# Patient Record
Sex: MALE | Race: WHITE | Employment: FULL TIME | URBAN - METROPOLITAN AREA
[De-identification: names, ages, dates, MRNs, and addresses within clinical notes are randomized per-mention and may not be internally consistent; named-entity substitution may affect disease eponyms.]

---

## 2020-01-30 ENCOUNTER — TELEPHONE (OUTPATIENT)
Dept: ORTHOPEDIC SURGERY | Age: 55
End: 2020-01-30

## 2020-01-30 NOTE — TELEPHONE ENCOUNTER
I reviewed his chart. I'm sure the delay is with [employer] processing the C9 for additional diagnosis of medial meniscus tear, which may have been denied by his employer. There also is an inconsistency  between the MRI and CT scan on which condyle the insufficiency fracture is. I'm not sure if surgery is necessary or not at this time. I CANNOT guarantee I can negotiate the [employer] system any faster than they are due to my current backlog of [employer] paperwork I have. Considering these factors, if he still wants to see me, then that is fine.

## 2020-02-03 NOTE — TELEPHONE ENCOUNTER
Current phone# is no longer working.   Got new phone numbers he has 2 cell phones the [phone] is personal and [phone] is work cell

## 2020-02-24 ENCOUNTER — OFFICE VISIT (OUTPATIENT)
Dept: ORTHOPEDIC SURGERY | Age: 55
End: 2020-02-24
Payer: COMMERCIAL

## 2020-02-24 VITALS
HEART RATE: 82 BPM | DIASTOLIC BLOOD PRESSURE: 87 MMHG | WEIGHT: 210 LBS | BODY MASS INDEX: 30.06 KG/M2 | HEIGHT: 70 IN | SYSTOLIC BLOOD PRESSURE: 136 MMHG

## 2020-02-24 PROCEDURE — 99203 OFFICE O/P NEW LOW 30 MIN: CPT | Performed by: ORTHOPAEDIC SURGERY

## 2020-02-24 RX ORDER — BUPROPION HYDROCHLORIDE 150 MG/1
TABLET, EXTENDED RELEASE ORAL
COMMUNITY
Start: 2019-12-16

## 2020-02-24 RX ORDER — MELOXICAM 15 MG/1
TABLET ORAL
COMMUNITY

## 2020-02-24 RX ORDER — MELOXICAM 7.5 MG/1
TABLET ORAL
COMMUNITY
Start: 2020-01-08 | End: 2020-02-24 | Stop reason: ALTCHOICE

## 2020-02-24 RX ORDER — IBUPROFEN 200 MG
TABLET ORAL
COMMUNITY

## 2020-02-24 RX ORDER — MELOXICAM 15 MG/1
TABLET ORAL
COMMUNITY
Start: 2020-02-06 | End: 2020-02-24 | Stop reason: ALTCHOICE

## 2020-02-24 ASSESSMENT — ENCOUNTER SYMPTOMS
VOMITING: 0
CONSTIPATION: 0
APNEA: 0
SHORTNESS OF BREATH: 0
DIARRHEA: 0
COUGH: 0
ABDOMINAL DISTENTION: 0
ABDOMINAL PAIN: 0
CHEST TIGHTNESS: 0
NAUSEA: 0
COLOR CHANGE: 0

## 2020-02-24 NOTE — PROGRESS NOTES
[facility] [address] AND [facility]ICINE  [address] [place] [address]  Dept: [phone]  Dept Fax: [phone]                                                                 Left Knee - New Patient     Subjective:     Chief Complaint   Patient presents with    Knee Injury     Patient is here today to establish acre for his left meniscus tear. DOI 08/05/2019. HPI:     Chaya Menard presents today for Left knee pain. The pain has been present  Since 08/05/2019. 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 The patient has tried mobic 15mg, cortisone, crutches,  with mild improvement. The pain is now described as shooting and Achy . There is  pain on weight bearing. The knee has swelled. There is  painful popping and clicking. The knee has not caught or locked up. The knee has given out. It is  stiff upon arising from sitting. It is  painful to go up and down stairs and sit for a prolonged time. The patient has had a cortisone injection 10/31/2019. The patient has not tried a lubrication injection. The patient has tried physical therapy once but care discontinued with other physician and patient states he does have appointment scheduled later this week at [facility]. The patient has not had surgery. The opposite knee is okay. Patient had labs done as well. Patient has medial pain with extension and posterior knee pain with active flexion. ROS:   Review of Systems   Constitutional: Positive for activity change. Negative for appetite change. Respiratory: Negative for apnea, cough, chest tightness and shortness of breath. Cardiovascular: Negative for chest pain, palpitations and leg swelling.    Gastrointestinal: Negative for abdominal distention, abdominal pain, constipation, diarrhea, nausea and together: Not on file     Attends [employer] service: Not on file     Active member of club or organization: Not on file     Attends meetings of clubs or organizations: Not on file     Relationship status: Not on file    Intimate partner violence:     Fear of current or ex partner: Not on file     Emotionally abused: Not on file     Physically abused: Not on file     Forced sexual activity: Not on file   Other Topics Concern    Not on file   Social History Narrative    Not on file       Family History:  No family history on file. Vitals:   /87   Pulse 82   Ht 5' 10\" (1.778 m)   Wt 210 lb (95.3 kg)   BMI 30.13 kg/m²  Body mass index is 30.13 kg/m². Physical Examination:     Orthopedics:    GENERAL: Alert and oriented X3 in no acute distress. SKIN: Intact without lesions or ulcerations. NEURO: Musculoskeletal and axillary nerves intact to sensory and motor testing. VASC: Capillary refill is less than 3 seconds. KNEE EXAM    LOCATION: Left Knee  GEN: Alert and oriented X 3, in no acute distress. GAIT: The patient's gait was observed while entering the exam room and was noted to be   antalgic. The extremity is in anatomic alignment. SKIN: Intact without rashes, lesions, or ulcerations. No obvious deformity. Swelling, hair pattern difference, with edema and redness and hypersensitivity to light touch. NEURO: The patient responds to light touch throughout left LE. Patellar and Achilles reflexes are 2/4. VASC: The left LE is neurovascularly intact with 2/4 DP and 2/4 PT pulses. Brisk capillary refill. ROM: 20 contracture/90 degrees. There is severe effusion. 20 degree extension lag  MUSC: decreased quad tone  LIGAMENT: Lachman's test is Negative with Good endpoint. Anterior drawer Negative. Posterior drawer Negative. There is  No varus instability at 0 degrees and No varus instability at 30 degrees. There is No valgus instability at 0 degrees and No valgus instability at 30 degrees.   SPECIAL: Deniz test is positive with - clunks, - crepitation, and + pain. There is a negative pivot shift. PALP: There is medial joint line pain and medial epicondyle pain. Assessment:     1. Sprain of medial collateral ligament of left knee, initial encounter      Procedures:    Procedure: no  Radiology:   See separate report   Plan:   Treatment : I reviewed the X-ray and MRI with the patient. We discussed the etiologies and natural histories of sprain of the left knee. We discussed the various treatment alternatives including anti-inflammatory medications, physical therapy, injections, further imaging studies and as a last result surgery. During today's visit,  Patient has a flexion contracture and I do not think he is ready for surgery at this point because he does not have full motion and edema. I think a surgery at this time would make it worse. I did advise him to go to physical therapy to work on ROM and quad strengthening. Once he gets ROM back I think a scope would be beneficial at that time. I do recommend an extension board to work on ROM and straightening his knee. There does seem to be an element of complex regional pain syndrome type 1 with his presentation as well. The patient has opted for going to physical therapy at a new facility. Patient does have an appointment already scheduled for Thursday. A physical therapy prescription was not given Patient should return to the clinic in 1 months to follow up with Zakia Jiménez D.O. . The patient will call the office immediately with any problems. No orders of the defined types were placed in this encounter. No orders of the defined types were placed in this encounter.       I, Magnus Zavala MS, AT, ATC, am scribing for and in the presence of Zakia Jiménez D.O.. 3/1/2020  5:38 PM    Electronically signed by Mario Alberto Lowe DO, on 3/1/2020 at 5:38 PM       I, Zakia Jiménez DO, have personally seen this patient and I have reviewed the CC, PMH, FHX and Social History as provided by other clinical staff. I reassessed the HPI and ROS as scribed by Devorah Maya MS AT [facility] in my presence and it is both accurate and complete. Thereafter, I personally performed the PE, reviewed the imaging and established the DX and POC. I agree with the documentation provided by the . I have reviewed all documentation in its entirety prior to providing my signature indicating agreement. Any areas of disagreement are noted on the chart.     Electronically signed by Jimbo Wills DO on 3/1/2020 at 5:38 PM

## 2020-04-03 ENCOUNTER — TELEPHONE (OUTPATIENT)
Dept: ORTHOPEDIC SURGERY | Age: 55
End: 2020-04-03

## 2020-04-09 ENCOUNTER — TELEMEDICINE (OUTPATIENT)
Dept: ORTHOPEDIC SURGERY | Age: 55
End: 2020-04-09
Payer: COMMERCIAL

## 2020-04-09 VITALS — HEIGHT: 70 IN | BODY MASS INDEX: 30.06 KG/M2 | WEIGHT: 210 LBS

## 2020-04-09 PROCEDURE — 99213 OFFICE O/P EST LOW 20 MIN: CPT | Performed by: ORTHOPAEDIC SURGERY

## 2020-04-09 NOTE — PROGRESS NOTES
[facility]  [address] [address]  Dept: [phone]  Dept Fax: [phone]        Ambulatory Follow Up      Subjective:   Shant Gordon is a 62y.o. year old male who presents to our office today for routine followup regarding his   1. Acute medial meniscus tear of left knee, sequela    2. Traumatic closed fracture of femoral condyle with minimal displacement with routine healing, unspecified laterality    3. Sprain of medial collateral ligament of left knee, sequela    . Chief Complaint   Patient presents with    Knee Pain     [facility] left knee pain   Shant Gordon presents today for Left knee pain. The pain has been present  Since 08/05/2019. 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 The patient has tried mobic 15mg, cortisone, crutches,  with mild improvement. The patient has had a cortisone injection 10/31/2019. The patient has not tried a lubrication injection. The patient has not had surgery. The opposite knee is okay. Patient had labs done as well. Since his last visit he stopped using the crutches and started trying to walk normally despite the pain. He is went to 5 weeks of physical therapy of 3 times a week. He states that his knee is \"way better, physical therapy did wonders\". States that he has almost full range of motion and can contract his quad. His ability to walk distances is up by two thirds. His knee still awakens him at night with a pinching pain medially. He limps and has pain when he is carrying things of weight and walking distances. Review of Systems    Objective :   Ht 5' 10\" (1.778 m)   Wt 210 lb (95.3 kg)   BMI 30.13 kg/m²  Body mass index is 30.13 kg/m².   General: Shant Gordon is a 62 y.o. male who is alert and oriented

## 2020-04-15 ENCOUNTER — TELEPHONE (OUTPATIENT)
Dept: ADMINISTRATIVE | Age: 55
End: 2020-04-15

## 2020-07-09 ENCOUNTER — TELEPHONE (OUTPATIENT)
Dept: ORTHOPEDIC SURGERY | Age: 55
End: 2020-07-09